# Patient Record
Sex: FEMALE | Race: WHITE | NOT HISPANIC OR LATINO | Employment: UNEMPLOYED | URBAN - METROPOLITAN AREA
[De-identification: names, ages, dates, MRNs, and addresses within clinical notes are randomized per-mention and may not be internally consistent; named-entity substitution may affect disease eponyms.]

---

## 2017-10-04 ENCOUNTER — HOSPITAL ENCOUNTER (OUTPATIENT)
Dept: MAMMOGRAPHY | Facility: CLINIC | Age: 42
Discharge: HOME | End: 2017-10-04
Attending: OBSTETRICS & GYNECOLOGY | Admitting: OBSTETRICS & GYNECOLOGY
Payer: COMMERCIAL

## 2017-10-04 DIAGNOSIS — Z12.31 ENCOUNTER FOR SCREENING MAMMOGRAM FOR HIGH-RISK PATIENT: ICD-10-CM

## 2017-10-04 PROCEDURE — G0202 SCR MAMMO BI INCL CAD: HCPCS

## 2018-07-18 ENCOUNTER — TRANSFERRED RECORDS (OUTPATIENT)
Dept: HEALTH INFORMATION MANAGEMENT | Facility: CLINIC | Age: 43
End: 2018-07-18

## 2018-07-20 DIAGNOSIS — R31.9 HEMATURIA: Primary | ICD-10-CM

## 2018-07-24 ENCOUNTER — TRANSFERRED RECORDS (OUTPATIENT)
Dept: HEALTH INFORMATION MANAGEMENT | Facility: CLINIC | Age: 43
End: 2018-07-24

## 2018-07-24 ENCOUNTER — OFFICE VISIT (OUTPATIENT)
Dept: UROLOGY | Facility: CLINIC | Age: 43
End: 2018-07-24
Payer: COMMERCIAL

## 2018-07-24 VITALS
DIASTOLIC BLOOD PRESSURE: 90 MMHG | HEIGHT: 67 IN | WEIGHT: 145 LBS | BODY MASS INDEX: 22.76 KG/M2 | SYSTOLIC BLOOD PRESSURE: 142 MMHG | HEART RATE: 92 BPM | OXYGEN SATURATION: 98 %

## 2018-07-24 DIAGNOSIS — R31.9 HEMATURIA, UNSPECIFIED TYPE: ICD-10-CM

## 2018-07-24 LAB
ALBUMIN UR-MCNC: NEGATIVE MG/DL
APPEARANCE UR: CLEAR
BILIRUB UR QL STRIP: NEGATIVE
COLOR UR AUTO: YELLOW
GLUCOSE UR STRIP-MCNC: NEGATIVE MG/DL
HGB UR QL STRIP: ABNORMAL
KETONES UR STRIP-MCNC: NEGATIVE MG/DL
LEUKOCYTE ESTERASE UR QL STRIP: NEGATIVE
NITRATE UR QL: NEGATIVE
PH UR STRIP: 7.5 PH (ref 5–7)
RBC #/AREA URNS AUTO: <1 /HPF (ref 0–2)
SOURCE: ABNORMAL
SP GR UR STRIP: 1.01 (ref 1–1.03)
UROBILINOGEN UR STRIP-ACNC: 0.2 EU/DL (ref 0.2–1)
WBC #/AREA URNS AUTO: <1 /HPF (ref 0–5)

## 2018-07-24 PROCEDURE — 99203 OFFICE O/P NEW LOW 30 MIN: CPT | Performed by: PHYSICIAN ASSISTANT

## 2018-07-24 PROCEDURE — 81003 URINALYSIS AUTO W/O SCOPE: CPT | Performed by: PHYSICIAN ASSISTANT

## 2018-07-24 RX ORDER — IBUPROFEN 200 MG
200 TABLET ORAL
COMMUNITY
Start: 2018-06-06 | End: 2022-05-26

## 2018-07-24 RX ORDER — VENLAFAXINE HYDROCHLORIDE 37.5 MG/1
37.5 CAPSULE, EXTENDED RELEASE ORAL
COMMUNITY
Start: 2018-06-06 | End: 2022-05-26

## 2018-07-24 ASSESSMENT — PAIN SCALES - GENERAL: PAINLEVEL: NO PAIN (0)

## 2018-07-24 NOTE — MR AVS SNAPSHOT
"              After Visit Summary   2018    Roma Reynolds    MRN: [id number]           Patient Information     Date Of Birth          1975        Visit Information        Provider Department      2018 3:30 PM Joan Rosen PA-C [facility] [place]        Today's Diagnoses     Hematuria, unspecified type           Follow-ups after your visit        Who to contact     If you have questions or need follow up information about today's clinic visit or your schedule please contact [facility] [place] directly at [phone].  Normal or non-critical lab and imaging results will be communicated to you by eMaginhart, letter or phone within 4 business days after the clinic has received the results. If you do not hear from us within 7 days, please contact the clinic through eMaginhart or phone. If you have a critical or abnormal lab result, we will notify you by phone as soon as possible.  Submit refill requests through Zuli or call your pharmacy and they will forward the refill request to us. Please allow 3 business days for your refill to be completed.          Additional Information About Your Visit        MyChart Information     Zuli lets you send messages to your doctor, view your test results, renew your prescriptions, schedule appointments and more. To sign up, go to [email or web address] . Click on \"Log in\" on the left side of the screen, which will take you to the Welcome page. Then click on \"Sign up Now\" on the right side of the page.     You will be asked to enter the access code listed below, as well as some personal information. Please follow the directions to create your username and password.     Your access code is: [email or web address]  Expires: 10/22/2018  4:04 PM     Your access code will  in 90 days. If you need help or a new code, please call your [facility] or [phone].        Care EveryWhere ID     This is " "your Care EveryWhere ID. This could be used by other organizations to access your [facility] medical records  [id number]        Your Vitals Were     Pulse Height Pulse Oximetry BMI (Body Mass Index)          92 1.702 m (5' 7\") 98% 22.71 kg/m2         Blood Pressure from Last 3 Encounters:   07/24/18 142/90    Weight from Last 3 Encounters:   07/24/18 65.8 kg (145 lb)   12/09/11 68 kg (150 lb)              We Performed the Following     UA without Microscopic     Urine Micro Urologic Phys        Primary Care Provider Office Phone # Fax #    Laurence Carter [phone]       [facility] [address]  [facility] [address]        Equal Access to Services     BEAU SANTOYO : Hadii shun boudreaux hadasho Soomaali, waaxda luqadaha, qaybta kaalmada adeegyada, waxay charissein hayalekseyn calvin rausch . So [facility] [phone].    ATENCIÓN: Si habla español, tiene a ibarra disposición servicios gratuitos de asistencia lingüística. Llame al [phone].    We comply with applicable federal civil rights laws and [place] laws. We do not discriminate on the basis of race, color, national origin, age, disability, sex, sexual orientation, or gender identity.            Thank you!     Thank you for choosing [facility] [place]  for your care. Our goal is always to provide you with excellent care. Hearing back from our patients is one way we can continue to improve our services. Please take a few minutes to complete the written survey that you may receive in the mail after your visit with us. Thank you!             Your Updated Medication List - Protect others around you: Learn how to safely use, store and throw away your medicines at www.disposemymeds.org.          This list is accurate as of 7/24/18  4:09 PM.  Always use your most recent med list.                   Brand Name Dispense Instructions for use Diagnosis    ibuprofen 200 MG tablet    ADVIL/MOTRIN     Take 200 mg by mouth "        venlafaxine 37.5 MG 24 hr capsule    EFFEXOR-XR     Take 37.5 mg by mouth

## 2018-07-24 NOTE — NURSING NOTE
Chief Complaint   Patient presents with     Clinic Care Coordination - Follow-up     Pt here for microhematuria     Luci Patel CMA

## 2018-07-24 NOTE — LETTER
"7/24/2018       RE: Roma Reynolds  [address] [facility] [address]     Dear Colleague,    Thank you for referring your patient, Roma Reynolds, to the [facility] [place] at [facility]. Please see a copy of my visit note below.    July 24, 2018      CC: \"Micro\" hematuria    HPI:  Roma Reynolds is a 43 year old female who presents in consultation from Anastasia Call CNP of [facility] for evaluation of the above. Had \"UTI\" symptoms. UA showed 1+, UC negative. No micro analysis was done.     No urinary symptoms today. UA trace blood.     PMH: None    SURG: None    Social History     Social History     Marital status:      Spouse name: N/A     Number of children: N/A     Years of education: N/A     Occupational History     Not on file.     Social History Main Topics     Smoking status: Never Smoker     Smokeless tobacco: Never Used     Alcohol use Not on file     Drug use: Not on file     Sexual activity: Not on file     Other Topics Concern     Not on file     Social History Narrative     No narrative on file       History reviewed. No pertinent family history.    ROS:14 point ROS neg other than the symptoms noted above in the HPI.    Not on File    Current Outpatient Prescriptions   Medication     venlafaxine (EFFEXOR-XR) 37.5 MG 24 hr capsule     ibuprofen (ADVIL/MOTRIN) 200 MG tablet     No current facility-administered medications for this visit.          PEx:   Blood pressure 142/90, pulse 92, height 1.702 m (5' 7\"), weight 65.8 kg (145 lb), SpO2 98 %.  5' 7\", Body mass index is 22.71 kg/(m^2)., 145 lbs 0 oz  Gen appearance:  Well groomed,: age-appropriate appearing female in NAD.   HEENT:  EOMI, AT NC, CN grossly normal  Psych:  alert , comfortable in no acute distress  Neuro:  A/O X 3  Skin:  Warm to touch, clear of rashes, ecchymoses  Resp:  No increased respiratory effort  lymph:  No LE edema  Abd:  Soft/NT, " ND, no palpable masses, no CVAT  Back: bony spine is non-tender, flanks are nontender    Urine: Trace blood      A/P: Roma Reynolds is a 43 year old female with hematuria on dip  Check micro to define if she truly has micro hematuria.   Discussed formal work up with CT Urogram, cysto and cytology if abnormal.   Will call her with results.     Joan Rosen PA-C  [facility]    30 minutes were spent with the patient today, > 50% in counseling and coordination of care

## 2018-07-24 NOTE — PROGRESS NOTES
"July 24, 2018      CC: \"Micro\" hematuria    HPI:  Roma Reynolds is a 43 year old female who presents in consultation from Anastasia Call CNP of [facility] for evaluation of the above. Had \"UTI\" symptoms. UA showed 1+, UC negative. No micro analysis was done.     No urinary symptoms today. UA trace blood.     PMH: None    SURG: None    Social History     Social History     Marital status:      Spouse name: N/A     Number of children: N/A     Years of education: N/A     Occupational History     Not on file.     Social History Main Topics     Smoking status: Never Smoker     Smokeless tobacco: Never Used     Alcohol use Not on file     Drug use: Not on file     Sexual activity: Not on file     Other Topics Concern     Not on file     Social History Narrative     No narrative on file       History reviewed. No pertinent family history.    ROS:14 point ROS neg other than the symptoms noted above in the HPI.    Not on File    Current Outpatient Prescriptions   Medication     venlafaxine (EFFEXOR-XR) 37.5 MG 24 hr capsule     ibuprofen (ADVIL/MOTRIN) 200 MG tablet     No current facility-administered medications for this visit.          PEx:   Blood pressure 142/90, pulse 92, height 1.702 m (5' 7\"), weight 65.8 kg (145 lb), SpO2 98 %.  5' 7\", Body mass index is 22.71 kg/(m^2)., 145 lbs 0 oz  Gen appearance:  Well groomed,: age-appropriate appearing female in NAD.   HEENT:  EOMI, AT NC, CN grossly normal  Psych:  alert , comfortable in no acute distress  Neuro:  A/O X 3  Skin:  Warm to touch, clear of rashes, ecchymoses  Resp:  No increased respiratory effort  lymph:  No LE edema  Abd:  Soft/NT, ND, no palpable masses, no CVAT  Back: bony spine is non-tender, flanks are nontender    Urine: Trace blood      A/P: Roma Reynolds is a 43 year old female with hematuria on dip  Check micro to define if she truly has micro hematuria.   Discussed formal work up with CT Urogram, cysto and cytology if " abnormal.   Will call her with results.     Joan Rosen PA-C  [facility]    30 minutes were spent with the patient today, > 50% in counseling and coordination of care

## 2018-07-25 ENCOUNTER — TELEPHONE (OUTPATIENT)
Dept: UROLOGY | Facility: CLINIC | Age: 43
End: 2018-07-25

## 2018-07-25 NOTE — TELEPHONE ENCOUNTER
Urine micro normal. Vmail left discussing this. No work up necessary. She was asked to call with questions.

## 2018-11-15 ENCOUNTER — HOSPITAL ENCOUNTER (OUTPATIENT)
Dept: MAMMOGRAPHY | Facility: CLINIC | Age: 43
Discharge: HOME | End: 2018-11-15
Attending: OBSTETRICS & GYNECOLOGY | Admitting: OBSTETRICS & GYNECOLOGY
Payer: COMMERCIAL

## 2018-11-15 DIAGNOSIS — Z12.39 BREAST CANCER SCREENING: ICD-10-CM

## 2018-11-15 PROCEDURE — 77067 SCR MAMMO BI INCL CAD: CPT

## 2019-01-28 ENCOUNTER — OFFICE VISIT (OUTPATIENT)
Dept: VASCULAR SURGERY | Facility: CLINIC | Age: 44
End: 2019-01-28
Payer: COMMERCIAL

## 2019-01-28 DIAGNOSIS — Z53.9 ERRONEOUS ENCOUNTER--DISREGARD: Primary | ICD-10-CM

## 2019-01-28 PROCEDURE — 99207 ZZC VEINSOLUTIONS FREE SCREENING: CPT | Performed by: SURGERY

## 2019-01-28 NOTE — PROGRESS NOTES
[facility]: Faye Reynolds comes to see me today for a venous screening evaluation.  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  The first was in 2008 and the second in 2011.  She noted varicose veins in her right proximal calf after her first pregnancy that somewhat improved.  These were worse after the second pregnancy and it gradually progressed causing more pain discomfort whenever she is standing which is required for her job.  These varicosities have become significantly larger over the last year on her right leg and she is also noted a single varicosity over the left mid anterior medial calf during this time.      She is never worn formal compression stockings.  Will notice very mild ankle edema bilaterally at the end of the day.  No history of phlebitis, DVT, bleeding, ulceration, skin discoloration or firmness.    PMH: Medications:.  None            Medical: Borderline hypertension not requiring medication            No prior major surgical procedures            Non-smoker.    FMH: Paternal aunt with varicose veins requiring surgical treatment in her 50s.      Exam: Very pleasant alert thin young woman.  Height 5 foot 7 inches.  Weight 145 pound              Chest= clear              Cardiovascular= regular rate              Extremities= no edema, normal sensation, no skin changes.                    +3 palpable posterior tibial pulses bilaterally                     On the right calf starting just below the knee joint medially is a large tortuous varicose vein going in a C-shaped anteriorly then posterior to the main greater saphenous vein measuring 3-4 mm in diameter.  Dilated ankle greater saphenous vein but not palpable in the thigh or calf.                    Left leg with single 3 mm varicose vein in the mid anterior medial calf possibly coming off the greater saphenous vein which is visible at the ankle though not significantly enlarged.      Impression: Symptomatic right  leg varicose veins most likely due to incompetence of the greater saphenous system.  Suspect normal deep system bilaterally.  May have an isolated primary varicose vein in the left calf.  Patient has not had any trial of compression that she will start doing so now using a CEP knee-high compression system in this on a daily basis to see if this improves her symptoms.  We will see her back after 3-month trial for a bilateral venous duplex ultrasound confirming the incompetence the greater saphenous system on the right and see if there is any problems on the left.  Very likely surgical treatment would be required on the right depending on the ultrasound findings and trial of conservative treatment with closure of the greater saphenous vein and cosmetic stab phlebectomies which we discussed.  Aware there may be some numbness that could be permanent of the greater saphenous nerve depending on how much of the vein needs to be treated in the calf region on the right.  We discussed her postoperative compression and duplex follow-up and restrictions to activities today.      Gee Hernández MD

## 2019-09-09 ENCOUNTER — APPOINTMENT (OUTPATIENT)
Dept: VASCULAR SURGERY | Facility: CLINIC | Age: 44
End: 2019-09-09
Payer: COMMERCIAL

## 2019-09-09 ENCOUNTER — OFFICE VISIT (OUTPATIENT)
Dept: VASCULAR SURGERY | Facility: CLINIC | Age: 44
End: 2019-09-09
Payer: COMMERCIAL

## 2019-09-09 DIAGNOSIS — Z53.9 ERRONEOUS ENCOUNTER--DISREGARD: Primary | ICD-10-CM

## 2019-09-09 PROCEDURE — 93970 EXTREMITY STUDY: CPT | Performed by: SURGERY

## 2019-09-09 PROCEDURE — A6533 GC STOCKING THIGHLNGTH 18-30: HCPCS | Performed by: SURGERY

## 2019-09-09 PROCEDURE — 99203 OFFICE O/P NEW LOW 30 MIN: CPT | Performed by: SURGERY

## 2019-09-09 NOTE — PROGRESS NOTES
[facility]    Roma Reynolds returns to see me at the [facility] office.  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  These become more painful and uncomfortable.  She has a single varicosity in the left leg anterior calf is left problematic.    She had not done compression therapy at that time and this was initiated.  She is been wearing this on a daily basis on her symptomatic right leg which does give her some mild improvement but still ongoing issues with pain and discomfort.  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  She has not required any oral medications for the pain mainly because she did not think of doing this prior to our discussion.    PMH: Unchanged from 1/28/2019 exam            No chronic medications.            Get very anxious prior to any type of procedure       However, she did have an ultrasound performed today and did very well with no medications.    Exam: Alert and appropriate.  Very comfortable.  Not anxious.               Chest= clear              Cardiovascular= regular rate  On the right calf there is a large C-shaped varicosity noted coming off the saphenous vein system.  This measured 3 to 4 mm in diameter.  A single 3 mm varicosities noted in the left leg.  No distal edema.  Normal pulses.  Normal skin.  Normal sensation.      I reviewed the venous duplex ultrasound performed today.  This revealed mild incompetence of the right deep system involving the common femoral vein in the proximal superficial femoral vein but the rest of the deep veins are normal on the right.  Greater saphenous vein is incompetent from the saphenofemoral junction down to the proximal one third of the calf measuring between 8.7 mm proximally-4 mm in the distal thigh and 5 mm in the proximal  calf.  This is a site with multiple varicosities are giving off.  Lesser accessory saphenous veins are normal and there is no incompetent perforators.    On the left leg where she has only one small single varicosity gives her discomfort but not as much as on the left.  Skin is segmental incompetence of the greater saphenous vein starting at the saphenofemoral junction extending all the way to the ankle.  No incompetent perforators and normal lesser saphenous vein.  Incompetence of the popliteal deep vein is noted but the rest of the deep system is normal.      Impression: Worsening symptomatic right calf varicose veins due to incompetent the greater saphenous system.  She is tried compression is ongoing issues.  I would recommend we perform closure the entire right greater saphenous system from the saphenofemoral junction of the ankle.  Where there may be some temporary or chronic numbness of the greater saphenous nerve distribution which we discussed.  Cosmetic stab phlebectomies will be performed also.  This would be performed under an oral sedation with Ativan and clonidine along with a tumescent anesthetic.  This should be adequate with her anxiety issues and this was also discussed.  We discussed her postoperative compression protocol and it duplex follow-up along with restrictions to her activities.  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                     She does have the one symptomatic left calf varicose vein.  We discussed the options with this.  I recommend that we remove this single vein with a stab phlebectomy at the time of the right leg surgery and wait and see if a more aggressive complete closure of the left greater saphenous vein is indicated but only of other varicosities develop.  She is very comfortable with this more conservative treatment which hopefully will resolve the issue on the  left leg.       Gee Hernández MD     Dictated 9/9/2090

## 2020-11-20 ENCOUNTER — TELEPHONE (OUTPATIENT)
Dept: PSYCHIATRY | Facility: CLINIC | Age: 45
End: 2020-11-20

## 2020-11-20 NOTE — TELEPHONE ENCOUNTER
PSYCHIATRY CLINIC PHONE INTAKE     SERVICES REQUESTED / INTERESTED IN          Med Management    Presenting Problem and Brief History                              What would you like to be seen for? (brief description):  JASON and Depression. Day to day stress and currently sees a therapist weekly (virtually) and a lot of the issues she is experiencing revolves around her job, , family, house. Has urgent sensation to always change things. Stressors of everyday is currently what is is working on. Pt is still able to function (like work) but depression is more lacking interaction. Pt doesn't interact much with other- will shut people out. Goes through phases of interaction but now with COVID she feels isolated. Pt feels like she wants to be isolated. Pt went off of meds in Jan 2019 and then had a massive time with it - ended up in ER due to physical symptoms like high HR, heart palps, sweating  Have you received a mental health diagnosis? Yes   Which one (s): JASON and Depression - Post partum in 2009 and then turned into JASON and depression  Is there any history of developmental delay?  No   Are you currently seeing a mental health provider?  Yes            Who / month last seen:  Was seeing a psychiatrist but the psychiatrist has turned into more of an education/administrative role  Do you have mental health records elsewhere?  Yes  Will you sign a release so we can obtain them?  Yes    Have you ever been hospitalized for psychiatric reasons?  Yes  Describe:  ED visit in 2019     Do you have current thoughts of self-harm?  No    Do you currently have thoughts of harming others?  No       Substance Use History     Do you have any history of alcohol / illicit drug use?  No  Describe:  NA  Have you ever received treatment for this?  No    Describe:  NA     Social History     Who is the patient's a guardian?  Yes    Name / number: Themself  Have you had an ACT team in last 12 months?  No  Describe: NA   Do you have  any current or past legal issues?  No  Describe: NA    OK to leave a detailed voicemail?  Yes    Medical/ Surgical History                                 There is no problem list on file for this patient.         Medications             Current Outpatient Medications   Medication Sig Dispense Refill     ibuprofen (ADVIL/MOTRIN) 200 MG tablet Take 200 mg by mouth       venlafaxine (EFFEXOR-XR) 37.5 MG 24 hr capsule Take 37.5 mg by mouth           DISPOSITION      Phone screen complete by Disha Rodriguez.  Sent to Destini Emanuel for review. AGE [facility] pending.

## 2021-05-04 ENCOUNTER — HOSPITAL ENCOUNTER (OUTPATIENT)
Dept: MAMMOGRAPHY | Facility: CLINIC | Age: 46
Discharge: HOME | End: 2021-05-04
Attending: OBSTETRICS & GYNECOLOGY | Admitting: OBSTETRICS & GYNECOLOGY
Payer: COMMERCIAL

## 2021-05-04 DIAGNOSIS — Z12.31 VISIT FOR SCREENING MAMMOGRAM: ICD-10-CM

## 2021-05-04 PROCEDURE — 77063 BREAST TOMOSYNTHESIS BI: CPT

## 2021-05-07 ENCOUNTER — HOSPITAL ENCOUNTER (OUTPATIENT)
Dept: MAMMOGRAPHY | Facility: CLINIC | Age: 46
End: 2021-05-07
Attending: OBSTETRICS & GYNECOLOGY
Payer: COMMERCIAL

## 2021-05-07 DIAGNOSIS — R92.8 ABNORMAL MAMMOGRAM: ICD-10-CM

## 2021-05-07 PROCEDURE — 76642 ULTRASOUND BREAST LIMITED: CPT | Mod: RT

## 2021-05-07 PROCEDURE — G0279 TOMOSYNTHESIS, MAMMO: HCPCS

## 2021-05-15 ENCOUNTER — HEALTH MAINTENANCE LETTER (OUTPATIENT)
Age: 46
End: 2021-05-15

## 2021-09-04 ENCOUNTER — HEALTH MAINTENANCE LETTER (OUTPATIENT)
Age: 46
End: 2021-09-04

## 2021-10-29 ENCOUNTER — LAB REQUISITION (OUTPATIENT)
Dept: LAB | Facility: CLINIC | Age: 46
End: 2021-10-29

## 2021-10-29 LAB — HBV SURFACE AB SERPL IA-ACNC: 35.43 M[IU]/ML

## 2021-10-29 PROCEDURE — 86481 TB AG RESPONSE T-CELL SUSP: CPT | Performed by: INTERNAL MEDICINE

## 2021-10-29 PROCEDURE — 86706 HEP B SURFACE ANTIBODY: CPT | Performed by: INTERNAL MEDICINE

## 2021-11-01 LAB
GAMMA INTERFERON BACKGROUND BLD IA-ACNC: 0.06 IU/ML
M TB IFN-G BLD-IMP: NEGATIVE
M TB IFN-G CD4+ BCKGRND COR BLD-ACNC: 9.94 IU/ML
MITOGEN IGNF BCKGRD COR BLD-ACNC: 0.01 IU/ML
MITOGEN IGNF BCKGRD COR BLD-ACNC: 0.01 IU/ML
QUANTIFERON MITOGEN: 10 IU/ML
QUANTIFERON NIL TUBE: 0.06 IU/ML
QUANTIFERON TB1 TUBE: 0.07 IU/ML
QUANTIFERON TB2 TUBE: 0.07

## 2022-05-13 ENCOUNTER — HOSPITAL ENCOUNTER (OUTPATIENT)
Dept: MAMMOGRAPHY | Facility: CLINIC | Age: 47
Discharge: HOME | End: 2022-05-13
Attending: OBSTETRICS & GYNECOLOGY | Admitting: OBSTETRICS & GYNECOLOGY
Payer: COMMERCIAL

## 2022-05-13 DIAGNOSIS — Z12.31 VISIT FOR SCREENING MAMMOGRAM: ICD-10-CM

## 2022-05-13 PROCEDURE — 77067 SCR MAMMO BI INCL CAD: CPT

## 2022-05-20 ENCOUNTER — HOSPITAL ENCOUNTER (OUTPATIENT)
Dept: MAMMOGRAPHY | Facility: CLINIC | Age: 47
Discharge: HOME | End: 2022-05-20
Attending: OBSTETRICS & GYNECOLOGY | Admitting: OBSTETRICS & GYNECOLOGY
Payer: COMMERCIAL

## 2022-05-20 DIAGNOSIS — R92.8 ABNORMAL MAMMOGRAM: ICD-10-CM

## 2022-05-20 PROCEDURE — 76642 ULTRASOUND BREAST LIMITED: CPT | Mod: RT

## 2022-05-26 ENCOUNTER — HOSPITAL ENCOUNTER (OUTPATIENT)
Dept: MAMMOGRAPHY | Facility: CLINIC | Age: 47
Discharge: HOME | End: 2022-05-26
Attending: OBSTETRICS & GYNECOLOGY
Payer: COMMERCIAL

## 2022-05-26 DIAGNOSIS — R92.8 ABNORMAL MAMMOGRAM: ICD-10-CM

## 2022-05-26 PROCEDURE — 88305 TISSUE EXAM BY PATHOLOGIST: CPT | Mod: TC | Performed by: OBSTETRICS & GYNECOLOGY

## 2022-05-26 PROCEDURE — 19083 BX BREAST 1ST LESION US IMAG: CPT | Mod: RT

## 2022-05-26 PROCEDURE — 999N000065 MA POST PROCEDURE RIGHT

## 2022-05-26 PROCEDURE — 250N000009 HC RX 250: Performed by: OBSTETRICS & GYNECOLOGY

## 2022-05-26 RX ORDER — METOPROLOL SUCCINATE 25 MG/1
25 TABLET, EXTENDED RELEASE ORAL
COMMUNITY
Start: 2019-04-10

## 2022-05-26 RX ORDER — METRONIDAZOLE 7.5 MG/G
GEL TOPICAL
COMMUNITY
Start: 2022-01-31

## 2022-05-26 RX ORDER — MINOCYCLINE HYDROCHLORIDE 100 MG/1
CAPSULE ORAL
COMMUNITY
Start: 2022-01-31

## 2022-05-26 RX ORDER — ARIPIPRAZOLE 2 MG/1
2 TABLET ORAL DAILY
COMMUNITY
Start: 2022-04-27

## 2022-05-26 RX ADMIN — LIDOCAINE HYDROCHLORIDE 5 ML: 10 INJECTION, SOLUTION INFILTRATION; PERINEURAL at 09:27

## 2022-05-26 NOTE — DISCHARGE INSTRUCTIONS

## 2022-05-27 ENCOUNTER — TELEPHONE (OUTPATIENT)
Dept: MAMMOGRAPHY | Facility: CLINIC | Age: 47
End: 2022-05-27
Payer: COMMERCIAL

## 2022-05-27 LAB
PATH REPORT.COMMENTS IMP SPEC: NORMAL
PATH REPORT.FINAL DX SPEC: NORMAL
PATH REPORT.GROSS SPEC: NORMAL
PATH REPORT.MICROSCOPIC SPEC OTHER STN: NORMAL
PHOTO IMAGE: NORMAL

## 2022-05-27 PROCEDURE — 88305 TISSUE EXAM BY PATHOLOGIST: CPT | Mod: 26 | Performed by: PATHOLOGY

## 2022-05-27 NOTE — TELEPHONE ENCOUNTER
After review by [facility] Center Radiologist, Dr. Av ZeeRoma was called,  verified, and given her 2022 Right Breast Biopsy results (Benign Breast Tissue) and recommended Follow up (Annual Screening Mammograms).   Patient denies any concerns with the biopsy site. Ordering provider was informed of the results and follow up plan.  I encouraged her to contact her doctor with any further breast concerns.  Patient verbalized understanding and agrees with the plan of care.      [facility]  Roma Reynolds J [id number]  F, 1975  Surgical Pathology Report (Final result) [id number]  Authorizing Provider: Ke Penny MD Ordering Provider: Ke Penny MD  Ordering Location: [facility]  Collected: 2022 09:25 AM  Pathologist: Sander Kurtz MD Received: 2022 11:15 AM  .  Specimens  A Breast, Right  .  .  Final Diagnosis  Breast, right, 10:00, 9 cm from nipple, ultrasound core biopsy-  Benign breast tissue (see description)  Electronically signed by Sander Kurtz MD on 2022 at 10:27 AM      Microscopic Description  Sections of breast show benign breast tissue. Focal pseudo angiomatous stromal hyperplasia cannot be excluded. There is no  evidence of malignancy.      Yuridia Espinoza RN BSN  Procedure Nurse  [facility]  [phone]

## 2022-06-11 ENCOUNTER — HEALTH MAINTENANCE LETTER (OUTPATIENT)
Age: 47
End: 2022-06-11

## 2022-10-16 ENCOUNTER — HEALTH MAINTENANCE LETTER (OUTPATIENT)
Age: 47
End: 2022-10-16

## 2023-06-17 ENCOUNTER — HEALTH MAINTENANCE LETTER (OUTPATIENT)
Age: 48
End: 2023-06-17

## 2023-08-26 ENCOUNTER — HEALTH MAINTENANCE LETTER (OUTPATIENT)
Age: 48
End: 2023-08-26

## 2023-10-10 ENCOUNTER — HOSPITAL ENCOUNTER (OUTPATIENT)
Dept: MAMMOGRAPHY | Facility: CLINIC | Age: 48
Discharge: HOME | End: 2023-10-10
Attending: OBSTETRICS & GYNECOLOGY | Admitting: OBSTETRICS & GYNECOLOGY
Payer: COMMERCIAL

## 2023-10-10 DIAGNOSIS — Z12.31 VISIT FOR SCREENING MAMMOGRAM: ICD-10-CM

## 2023-10-10 PROCEDURE — 77067 SCR MAMMO BI INCL CAD: CPT

## 2024-08-10 ENCOUNTER — HEALTH MAINTENANCE LETTER (OUTPATIENT)
Age: 49
End: 2024-08-10

## 2024-10-30 ENCOUNTER — HOSPITAL ENCOUNTER (OUTPATIENT)
Dept: MAMMOGRAPHY | Facility: CLINIC | Age: 49
Discharge: HOME | End: 2024-10-30
Attending: INTERNAL MEDICINE | Admitting: INTERNAL MEDICINE
Payer: COMMERCIAL

## 2024-10-30 DIAGNOSIS — Z12.31 VISIT FOR SCREENING MAMMOGRAM: ICD-10-CM

## 2024-10-30 PROCEDURE — 77063 BREAST TOMOSYNTHESIS BI: CPT

## 2025-08-16 ENCOUNTER — HEALTH MAINTENANCE LETTER (OUTPATIENT)
Age: 50
End: 2025-08-16